# Patient Record
Sex: FEMALE | Race: WHITE | NOT HISPANIC OR LATINO | Employment: UNEMPLOYED | URBAN - METROPOLITAN AREA
[De-identification: names, ages, dates, MRNs, and addresses within clinical notes are randomized per-mention and may not be internally consistent; named-entity substitution may affect disease eponyms.]

---

## 2023-01-17 ENCOUNTER — HOSPITAL ENCOUNTER (EMERGENCY)
Facility: MEDICAL CENTER | Age: 53
End: 2023-01-17
Attending: STUDENT IN AN ORGANIZED HEALTH CARE EDUCATION/TRAINING PROGRAM
Payer: COMMERCIAL

## 2023-01-17 ENCOUNTER — APPOINTMENT (OUTPATIENT)
Dept: RADIOLOGY | Facility: MEDICAL CENTER | Age: 53
End: 2023-01-17
Attending: STUDENT IN AN ORGANIZED HEALTH CARE EDUCATION/TRAINING PROGRAM
Payer: COMMERCIAL

## 2023-01-17 VITALS
WEIGHT: 121.47 LBS | BODY MASS INDEX: 19.07 KG/M2 | OXYGEN SATURATION: 94 % | SYSTOLIC BLOOD PRESSURE: 97 MMHG | DIASTOLIC BLOOD PRESSURE: 57 MMHG | TEMPERATURE: 98.2 F | RESPIRATION RATE: 23 BRPM | HEART RATE: 69 BPM | HEIGHT: 67 IN

## 2023-01-17 DIAGNOSIS — K22.4 ESOPHAGEAL SPASM: ICD-10-CM

## 2023-01-17 DIAGNOSIS — K22.4 JACKHAMMER ESOPHAGUS: ICD-10-CM

## 2023-01-17 LAB
ALBUMIN SERPL BCP-MCNC: 4.6 G/DL (ref 3.2–4.9)
ALBUMIN/GLOB SERPL: 1.8 G/DL
ALP SERPL-CCNC: 112 U/L (ref 30–99)
ALT SERPL-CCNC: 16 U/L (ref 2–50)
ANION GAP SERPL CALC-SCNC: 10 MMOL/L (ref 7–16)
APPEARANCE UR: CLEAR
AST SERPL-CCNC: 18 U/L (ref 12–45)
BASOPHILS # BLD AUTO: 0.9 % (ref 0–1.8)
BASOPHILS # BLD: 0.07 K/UL (ref 0–0.12)
BILIRUB SERPL-MCNC: 0.2 MG/DL (ref 0.1–1.5)
BILIRUB UR QL STRIP.AUTO: NEGATIVE
BUN SERPL-MCNC: 11 MG/DL (ref 8–22)
CALCIUM ALBUM COR SERPL-MCNC: 9.2 MG/DL (ref 8.5–10.5)
CALCIUM SERPL-MCNC: 9.7 MG/DL (ref 8.5–10.5)
CHLORIDE SERPL-SCNC: 102 MMOL/L (ref 96–112)
CO2 SERPL-SCNC: 27 MMOL/L (ref 20–33)
COLOR UR: YELLOW
CREAT SERPL-MCNC: 0.49 MG/DL (ref 0.5–1.4)
EKG IMPRESSION: NORMAL
EOSINOPHIL # BLD AUTO: 0.09 K/UL (ref 0–0.51)
EOSINOPHIL NFR BLD: 1.1 % (ref 0–6.9)
ERYTHROCYTE [DISTWIDTH] IN BLOOD BY AUTOMATED COUNT: 43.5 FL (ref 35.9–50)
GFR SERPLBLD CREATININE-BSD FMLA CKD-EPI: 113 ML/MIN/1.73 M 2
GLOBULIN SER CALC-MCNC: 2.6 G/DL (ref 1.9–3.5)
GLUCOSE SERPL-MCNC: 85 MG/DL (ref 65–99)
GLUCOSE UR STRIP.AUTO-MCNC: NEGATIVE MG/DL
HCT VFR BLD AUTO: 44.1 % (ref 37–47)
HGB BLD-MCNC: 15 G/DL (ref 12–16)
IMM GRANULOCYTES # BLD AUTO: 0.03 K/UL (ref 0–0.11)
IMM GRANULOCYTES NFR BLD AUTO: 0.4 % (ref 0–0.9)
KETONES UR STRIP.AUTO-MCNC: NEGATIVE MG/DL
LEUKOCYTE ESTERASE UR QL STRIP.AUTO: NEGATIVE
LIPASE SERPL-CCNC: 19 U/L (ref 11–82)
LYMPHOCYTES # BLD AUTO: 2.09 K/UL (ref 1–4.8)
LYMPHOCYTES NFR BLD: 25.7 % (ref 22–41)
MCH RBC QN AUTO: 31.6 PG (ref 27–33)
MCHC RBC AUTO-ENTMCNC: 34 G/DL (ref 33.6–35)
MCV RBC AUTO: 92.8 FL (ref 81.4–97.8)
MICRO URNS: NORMAL
MONOCYTES # BLD AUTO: 0.39 K/UL (ref 0–0.85)
MONOCYTES NFR BLD AUTO: 4.8 % (ref 0–13.4)
NEUTROPHILS # BLD AUTO: 5.47 K/UL (ref 2–7.15)
NEUTROPHILS NFR BLD: 67.1 % (ref 44–72)
NITRITE UR QL STRIP.AUTO: NEGATIVE
NRBC # BLD AUTO: 0 K/UL
NRBC BLD-RTO: 0 /100 WBC
PH UR STRIP.AUTO: 6.5 [PH] (ref 5–8)
PLATELET # BLD AUTO: 266 K/UL (ref 164–446)
PMV BLD AUTO: 9.4 FL (ref 9–12.9)
POTASSIUM SERPL-SCNC: 4.5 MMOL/L (ref 3.6–5.5)
PROT SERPL-MCNC: 7.2 G/DL (ref 6–8.2)
PROT UR QL STRIP: NEGATIVE MG/DL
RBC # BLD AUTO: 4.75 M/UL (ref 4.2–5.4)
RBC UR QL AUTO: NEGATIVE
SODIUM SERPL-SCNC: 139 MMOL/L (ref 135–145)
SP GR UR STRIP.AUTO: 1.01
TROPONIN T SERPL-MCNC: <6 NG/L (ref 6–19)
TROPONIN T SERPL-MCNC: <6 NG/L (ref 6–19)
UROBILINOGEN UR STRIP.AUTO-MCNC: 0.2 MG/DL
WBC # BLD AUTO: 8.1 K/UL (ref 4.8–10.8)

## 2023-01-17 PROCEDURE — 36415 COLL VENOUS BLD VENIPUNCTURE: CPT

## 2023-01-17 PROCEDURE — 84484 ASSAY OF TROPONIN QUANT: CPT

## 2023-01-17 PROCEDURE — 81003 URINALYSIS AUTO W/O SCOPE: CPT

## 2023-01-17 PROCEDURE — 96375 TX/PRO/DX INJ NEW DRUG ADDON: CPT

## 2023-01-17 PROCEDURE — 83690 ASSAY OF LIPASE: CPT

## 2023-01-17 PROCEDURE — 99285 EMERGENCY DEPT VISIT HI MDM: CPT

## 2023-01-17 PROCEDURE — 93005 ELECTROCARDIOGRAM TRACING: CPT | Performed by: STUDENT IN AN ORGANIZED HEALTH CARE EDUCATION/TRAINING PROGRAM

## 2023-01-17 PROCEDURE — 93005 ELECTROCARDIOGRAM TRACING: CPT

## 2023-01-17 PROCEDURE — 96374 THER/PROPH/DIAG INJ IV PUSH: CPT

## 2023-01-17 PROCEDURE — 80053 COMPREHEN METABOLIC PANEL: CPT

## 2023-01-17 PROCEDURE — 71045 X-RAY EXAM CHEST 1 VIEW: CPT

## 2023-01-17 PROCEDURE — 85025 COMPLETE CBC W/AUTO DIFF WBC: CPT

## 2023-01-17 PROCEDURE — 700111 HCHG RX REV CODE 636 W/ 250 OVERRIDE (IP): Performed by: STUDENT IN AN ORGANIZED HEALTH CARE EDUCATION/TRAINING PROGRAM

## 2023-01-17 PROCEDURE — C9113 INJ PANTOPRAZOLE SODIUM, VIA: HCPCS | Performed by: STUDENT IN AN ORGANIZED HEALTH CARE EDUCATION/TRAINING PROGRAM

## 2023-01-17 RX ORDER — PANTOPRAZOLE SODIUM 40 MG/10ML
40 INJECTION, POWDER, LYOPHILIZED, FOR SOLUTION INTRAVENOUS ONCE
Status: COMPLETED | OUTPATIENT
Start: 2023-01-17 | End: 2023-01-17

## 2023-01-17 RX ORDER — OXYCODONE HYDROCHLORIDE 5 MG/1
5 TABLET ORAL EVERY 4 HOURS PRN
Qty: 10 TABLET | Refills: 0 | Status: SHIPPED | OUTPATIENT
Start: 2023-01-17 | End: 2023-01-20

## 2023-01-17 RX ORDER — CYCLOBENZAPRINE HCL 10 MG
10 TABLET ORAL 3 TIMES DAILY PRN
Qty: 30 TABLET | Refills: 0 | Status: SHIPPED | OUTPATIENT
Start: 2023-01-17

## 2023-01-17 RX ORDER — KETOROLAC TROMETHAMINE 30 MG/ML
15 INJECTION, SOLUTION INTRAMUSCULAR; INTRAVENOUS ONCE
Status: COMPLETED | OUTPATIENT
Start: 2023-01-17 | End: 2023-01-17

## 2023-01-17 RX ORDER — ONDANSETRON 2 MG/ML
4 INJECTION INTRAMUSCULAR; INTRAVENOUS ONCE
Status: COMPLETED | OUTPATIENT
Start: 2023-01-17 | End: 2023-01-17

## 2023-01-17 RX ORDER — HYDROMORPHONE HYDROCHLORIDE 1 MG/ML
0.5 INJECTION, SOLUTION INTRAMUSCULAR; INTRAVENOUS; SUBCUTANEOUS ONCE
Status: COMPLETED | OUTPATIENT
Start: 2023-01-17 | End: 2023-01-17

## 2023-01-17 RX ORDER — SERTRALINE HYDROCHLORIDE 100 MG/1
100 TABLET, FILM COATED ORAL DAILY
Qty: 14 TABLET | Refills: 0 | Status: SHIPPED | OUTPATIENT
Start: 2023-01-17 | End: 2023-01-31

## 2023-01-17 RX ADMIN — HYDROMORPHONE HYDROCHLORIDE 0.5 MG: 1 INJECTION, SOLUTION INTRAMUSCULAR; INTRAVENOUS; SUBCUTANEOUS at 19:47

## 2023-01-17 RX ADMIN — KETOROLAC TROMETHAMINE 15 MG: 30 INJECTION, SOLUTION INTRAMUSCULAR; INTRAVENOUS at 19:47

## 2023-01-17 RX ADMIN — PANTOPRAZOLE SODIUM 40 MG: 40 INJECTION, POWDER, LYOPHILIZED, FOR SOLUTION INTRAVENOUS at 19:45

## 2023-01-17 RX ADMIN — ONDANSETRON 4 MG: 2 INJECTION INTRAMUSCULAR; INTRAVENOUS at 19:47

## 2023-01-18 NOTE — ED TRIAGE NOTES
"Chief Complaint   Patient presents with    Abdominal Cramping     Pt states \"I have abdominal cramping and pain from my heart down to my big toe. I also have jackhammer esophagus and it's hard to swallow.\" Pt states symptoms have been persisting since 1/12. Complains of nausea and diarrhea.     Pt educated upon triage process and told to inform  staff of any changes in condition so that Pt may be reassessed. No further questions at this time. Pt sitting out in lobby.    "

## 2023-01-18 NOTE — ED PROVIDER NOTES
"ED Provider Note    CHIEF COMPLAINT  Chief Complaint   Patient presents with    Abdominal Cramping     Pt states \"I have abdominal cramping and pain from my heart down to my big toe. I also have jackhammer esophagus and it's hard to swallow.\" Pt states symptoms have been persisting since 1/12. Complains of nausea and diarrhea.       EXTERNAL RECORDS REVIEWED  Other None    HPI/ROS  LIMITATION TO HISTORY   Select: : None      Abi Dominguez is a 52 y.o. female who presents with esophageal spasms.  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 This has been an ongoing issue and she has previously been seen at the [facility].  She had tried many previous therapies including sertraline which she had been well controlled on for years.  She said her doctor transitioned her to duloxetine and she has been taking that now for months.  They have been increasing the dose as an outpatient and on Thursday she had her last dose increased to 90 mg daily.  She said that she has had severe pain since Thursday and has been so bad that she actually discontinued her duloxetine.  She says that the pain does feel typical of when she has her esophageal spasms however it is more severe.  She also endorses difficulty swallowing.  She says that it is painful from her chest, radiating all the way down to her toes. She says it is severe and unbearable.  Has associated nausea but no vomiting.  She has had some intermittent diarrhea.  She denies any fevers or chills.    She says that she gets her medical care from [facility] psychiatry.  She has tried many previous therapies including Botox which did not work.    PAST MEDICAL HISTORY    Jackhammer esophagus    SURGICAL HISTORY  patient denies any surgical history    FAMILY HISTORY  History reviewed. No pertinent family history.    SOCIAL HISTORY  Social History     Tobacco Use    Smoking status: Every Day     Types: Cigarettes    Smokeless tobacco: Never " "  Substance and Sexual Activity    Alcohol use: Not Currently    Drug use: Never    Sexual activity: Not on file       CURRENT MEDICATIONS  Home Medications    **Home medications have not yet been reviewed for this encounter**         ALLERGIES  Allergies   Allergen Reactions    Sulfa Drugs Rash and Itching             PHYSICAL EXAM  VITAL SIGNS: BP 97/57   Pulse 69   Temp 36.8 °C (98.2 °F) (Temporal)   Resp (!) 23   Ht 1.702 m (5' 7\")   Wt 55.1 kg (121 lb 7.6 oz)   SpO2 94%   BMI 19.03 kg/m²    Constitutional: Awake and alert . No distress  HENT: Normal inspection  moist mucous membranes  Eyes: Normal inspection  Neck: Grossly normal range of motion.  Cardiovascular: Normal heart rate, Normal rhythm.  Symmetric peripheral pulses.   Thorax & Lungs: No respiratory distress, No wheezing, No rales, No rhonchi, No chest tenderness.   Abdomen: Soft, non-distended, nontender, no mass  Skin: No obvious rash.  Extremities: Warm, well perfused. No clubbing, cyanosis, edema   Neurologic: Grossly normal   Psychiatric: Normal for situation      DIAGNOSTIC STUDIES / PROCEDURES  EKG  I have independently interpreted this EKG  Normal rate, normal rhythm, normal axis.  No ST wave elevations or depressions.  Intervals within normal limits.  She has right atrial enlargement with possibly RVH    LABS  Results for orders placed or performed during the hospital encounter of 01/17/23   CBC with Differential   Result Value Ref Range    WBC 8.1 4.8 - 10.8 K/uL    RBC 4.75 4.20 - 5.40 M/uL    Hemoglobin 15.0 12.0 - 16.0 g/dL    Hematocrit 44.1 37.0 - 47.0 %    MCV 92.8 81.4 - 97.8 fL    MCH 31.6 27.0 - 33.0 pg    MCHC 34.0 33.6 - 35.0 g/dL    RDW 43.5 35.9 - 50.0 fL    Platelet Count 266 164 - 446 K/uL    MPV 9.4 9.0 - 12.9 fL    Neutrophils-Polys 67.10 44.00 - 72.00 %    Lymphocytes 25.70 22.00 - 41.00 %    Monocytes 4.80 0.00 - 13.40 %    Eosinophils 1.10 0.00 - 6.90 %    Basophils 0.90 0.00 - 1.80 %    Immature Granulocytes 0.40 " 0.00 - 0.90 %    Nucleated RBC 0.00 /100 WBC    Neutrophils (Absolute) 5.47 2.00 - 7.15 K/uL    Lymphs (Absolute) 2.09 1.00 - 4.80 K/uL    Monos (Absolute) 0.39 0.00 - 0.85 K/uL    Eos (Absolute) 0.09 0.00 - 0.51 K/uL    Baso (Absolute) 0.07 0.00 - 0.12 K/uL    Immature Granulocytes (abs) 0.03 0.00 - 0.11 K/uL    NRBC (Absolute) 0.00 K/uL   Complete Metabolic Panel   Result Value Ref Range    Sodium 139 135 - 145 mmol/L    Potassium 4.5 3.6 - 5.5 mmol/L    Chloride 102 96 - 112 mmol/L    Co2 27 20 - 33 mmol/L    Anion Gap 10.0 7.0 - 16.0    Glucose 85 65 - 99 mg/dL    Bun 11 8 - 22 mg/dL    Creatinine 0.49 (L) 0.50 - 1.40 mg/dL    Calcium 9.7 8.5 - 10.5 mg/dL    AST(SGOT) 18 12 - 45 U/L    ALT(SGPT) 16 2 - 50 U/L    Alkaline Phosphatase 112 (H) 30 - 99 U/L    Total Bilirubin 0.2 0.1 - 1.5 mg/dL    Albumin 4.6 3.2 - 4.9 g/dL    Total Protein 7.2 6.0 - 8.2 g/dL    Globulin 2.6 1.9 - 3.5 g/dL    A-G Ratio 1.8 g/dL   Lipase   Result Value Ref Range    Lipase 19 11 - 82 U/L   Urinalysis    Specimen: Urine   Result Value Ref Range    Color Yellow     Character Clear     Specific Gravity 1.009 <1.035    Ph 6.5 5.0 - 8.0    Glucose Negative Negative mg/dL    Ketones Negative Negative mg/dL    Protein Negative Negative mg/dL    Bilirubin Negative Negative    Urobilinogen, Urine 0.2 Negative    Nitrite Negative Negative    Leukocyte Esterase Negative Negative    Occult Blood Negative Negative    Micro Urine Req see below    Troponins NOW   Result Value Ref Range    Troponin T <6 6 - 19 ng/L   Troponins in two (2) hours   Result Value Ref Range    Troponin T <6 6 - 19 ng/L   CORRECTED CALCIUM   Result Value Ref Range    Correct Calcium 9.2 8.5 - 10.5 mg/dL   ESTIMATED GFR   Result Value Ref Range    GFR (CKD-EPI) 113 >60 mL/min/1.73 m 2   EKG   Result Value Ref Range    Report       [facility] Emergency Dept.    Test Date:  2023-01-17  Pt Name:    LARS ABEL          Department: ER  MRN:         [id number]                      Room:  Gender:     Female                       Technician: [id number]  :        1970                   Requested By:ER TRIAGE PROTOCOL  Order #:    [id number]                    Reading MD:    Measurements  Intervals                                Axis  Rate:       72                           P:          78  MI:         146                          QRS:        70  QRSD:       85                           T:          69  QT:         414  QTc:        454    Interpretive Statements  Sinus rhythm  Right atrial enlargement  Consider RVH w/ secondary repol abnormality  No previous ECG available for comparison         RADIOLOGY  I have independently interpreted the diagnostic imaging associated with this visit and am waiting the final reading from the radiologist.   My preliminary interpretation is a follows: CXR no pneumonia    COURSE & MEDICAL DECISION MAKING    ED Observation Status? No; Patient does not meet criteria for ED Observation.     INITIAL ASSESSMENT AND PLAN  Care Narrative: This is a 52-year-old with a longstanding history of jackhammer esophagus.  She says that she was previously well controlled on an SSRI but since starting duloxetine has had worsening of her symptoms.  This acutely became acutely more severe on Thursday when her dose of duloxetine was increased to 90 mg.  She therefore stopped taking her duloxetine as of Friday.  She presents today for acute pain management.  She does state that the pain feels similar to when she has been having the spasms, albeit more severe.  Her work-up here is overall reassuring.  I doubt ACS as she has a normal  EKG and normal troponin.  She is low risk for ACS with a heart score of 1.  She has no other electrolyte derangements and her lab work is normal.  I doubt pulmonary embolism given chronicity of symptoms, no hypoxia or tachycardia.  Aortic pathology seems unlikely widening of the mediastinum on XR, she has symmetric pulses and  no neurological deficits.   I think that her pain is most consistent with her known history of jackhammer, esophagus.  Pain was controlled with IV Toradol, Dilaudid & pantoprazole.  I discussed this patient with pharmacist at length.  We agreed to take her off of the duloxetine and start her back on the sertraline.  She was previously on 200 mg daily and I will start her on 100 mg with recommendation to follow-up as an outpatient to have this further uptitrated.  On reevaluation her pain is significantly improved.  She feels comfortable with the plan for outpatient follow-up and strict ER return precautions.    ADDITIONAL PROBLEM LIST AND DISPOSITION    Problem #1: Esophageal Pain    I have discussed management of the patient with the following physicians and MERLENE's:  None    Discussion of management with other [facility] or appropriate source(s): Pharmacy see above      Barriers to care at this time, including but not limited to:  N/A .     Decision tools and prescription drugs considered including, but not limited to: Pain Medications provided .    HTN/IDDM FOLLOW UP:  The patient is referred to a primary physician for blood pressure management, diabetic screening, and for all other preventive health concerns    I prescribed a short course of opiates to this patient due to their degree of pain. I reviewed the PDMP and there are no findings of concern.  The patient was counseled that the prescribed pain medication is highly addictive.  They are instructed only to use it for breakthrough pain. They also must not drink, drive or operate machinery when using this medication.           FINAL DIAGNOSIS  1. Esophageal spasm Acute   2. Jackhammer esophagus               Electronically signed by: Shreya Cobb M.D., 1/17/2023 7:25 PM

## 2023-01-18 NOTE — DISCHARGE INSTRUCTIONS
Please contact your doctors this week in order to schedule a follow-up.  You will likely need to have the dose of your sertraline increased. You can stop taking your duloxetine.     For pain, take Ibuprofen (Motrin, Advil) 600 mg up to every six hours  mg up to every eight hours if your stomach can take it. Instead of Ibuprofen, you can choose Naproxen (Aleve) and take 250-500 mg twice daily. Take Ibuprofen or Naproxen with food to lessen stomach irritation. You may also take Acetaminophen (Tylenol) 500mg (may take up to 1gm) every six hours in addition to Ibuprofen or Naproxen. (Maximum Tylenol 4 gm/day).  I suggest alternating between the ibuprofen and Tylenol every 4 hours for optimal pain relief and adequate spacing of each medication.     The prescribed pain medication is highly addictive.  Please only take it for breakthrough pain that is not relieved by scheduled ibuprofen, Tylenol and other recommended medications.  Please be aware that the opiate pain or muscle relaxant medications that you have been prescribed may cause drowsiness or somnolence. DO NOT drink or drive or operate machinery when taking this medication.

## 2024-05-10 ENCOUNTER — HOSPITAL ENCOUNTER (EMERGENCY)
Facility: MEDICAL CENTER | Age: 54
End: 2024-05-10
Attending: STUDENT IN AN ORGANIZED HEALTH CARE EDUCATION/TRAINING PROGRAM
Payer: COMMERCIAL

## 2024-05-10 ENCOUNTER — PHARMACY VISIT (OUTPATIENT)
Dept: PHARMACY | Facility: MEDICAL CENTER | Age: 54
End: 2024-05-10
Payer: COMMERCIAL

## 2024-05-10 VITALS
RESPIRATION RATE: 17 BRPM | HEART RATE: 72 BPM | SYSTOLIC BLOOD PRESSURE: 116 MMHG | DIASTOLIC BLOOD PRESSURE: 71 MMHG | BODY MASS INDEX: 18.75 KG/M2 | TEMPERATURE: 98.7 F | OXYGEN SATURATION: 96 % | HEIGHT: 67 IN | WEIGHT: 119.49 LBS

## 2024-05-10 DIAGNOSIS — K04.7 PERIAPICAL ABSCESS: ICD-10-CM

## 2024-05-10 LAB
FLUAV RNA SPEC QL NAA+PROBE: NEGATIVE
FLUBV RNA SPEC QL NAA+PROBE: NEGATIVE
RSV RNA SPEC QL NAA+PROBE: NEGATIVE
SARS-COV-2 RNA RESP QL NAA+PROBE: NOTDETECTED

## 2024-05-10 PROCEDURE — RXMED WILLOW AMBULATORY MEDICATION CHARGE: Performed by: STUDENT IN AN ORGANIZED HEALTH CARE EDUCATION/TRAINING PROGRAM

## 2024-05-10 RX ORDER — TRAZODONE HYDROCHLORIDE 50 MG/1
50 TABLET ORAL NIGHTLY
COMMUNITY

## 2024-05-10 RX ORDER — OMEPRAZOLE 20 MG/1
20 CAPSULE, DELAYED RELEASE ORAL DAILY
COMMUNITY

## 2024-05-10 RX ORDER — IBUPROFEN 400 MG/1
400 TABLET ORAL EVERY 6 HOURS PRN
Qty: 30 TABLET | Refills: 0 | Status: SHIPPED | OUTPATIENT
Start: 2024-05-10

## 2024-05-10 RX ORDER — ACETAMINOPHEN 325 MG/1
1000 TABLET ORAL EVERY 6 HOURS PRN
Qty: 30 TABLET | Refills: 0 | Status: SHIPPED | OUTPATIENT
Start: 2024-05-10

## 2024-05-10 RX ORDER — LIDOCAINE HYDROCHLORIDE AND EPINEPHRINE 10; 10 MG/ML; UG/ML
20 INJECTION, SOLUTION INFILTRATION; PERINEURAL ONCE
Status: COMPLETED | OUTPATIENT
Start: 2024-05-10 | End: 2024-05-10

## 2024-05-10 RX ORDER — AMOXICILLIN AND CLAVULANATE POTASSIUM 875; 125 MG/1; MG/1
1 TABLET, FILM COATED ORAL 2 TIMES DAILY
Qty: 20 TABLET | Refills: 0 | Status: ACTIVE | OUTPATIENT
Start: 2024-05-10 | End: 2024-05-20

## 2024-05-10 RX ORDER — QUETIAPINE FUMARATE 25 MG/1
25 TABLET, FILM COATED ORAL 2 TIMES DAILY
COMMUNITY

## 2024-05-10 RX ORDER — AMOXICILLIN AND CLAVULANATE POTASSIUM 875; 125 MG/1; MG/1
1 TABLET, FILM COATED ORAL ONCE
Status: COMPLETED | OUTPATIENT
Start: 2024-05-10 | End: 2024-05-10

## 2024-05-10 RX ADMIN — LIDOCAINE HYDROCHLORIDE AND EPINEPHRINE 20 ML: 10; 10 INJECTION, SOLUTION INFILTRATION; PERINEURAL at 22:59

## 2024-05-10 RX ADMIN — AMOXICILLIN AND CLAVULANATE POTASSIUM 1 TABLET: 875; 125 TABLET, FILM COATED ORAL at 22:51

## 2024-05-10 ASSESSMENT — FIBROSIS 4 INDEX: FIB4 SCORE: 0.9

## 2024-05-11 NOTE — ED NOTES
Patient discharged with prescriptions and instruction. Verbalized understanding. Dental referral list provided.

## 2024-05-11 NOTE — ED PROVIDER NOTES
"ED Provider Note    CHIEF COMPLAINT  Chief Complaint   Patient presents with    Nasal Congestion     On and off x couple of days. Denies cough, sore throat, fever/chills. Hx: sinusitis       EXTERNAL RECORDS REVIEWED  No relevant external notes available    HPI/ROS  LIMITATION TO HISTORY   Select: : None  OUTSIDE HISTORIAN(S):  None    Abi Dominguez is a 54 y.o. female presenting to the emergency department for left facial fullness, left maxillary tooth pain.  Says she has been congested over the last few days.  Symptoms started approximately 5 days ago but swelling to the face has progressed over the last 2 days.  No fevers or chills.  No body aches.    Has not seen a dentist for several years.    PAST MEDICAL HISTORY       SURGICAL HISTORY  patient denies any surgical history    FAMILY HISTORY  No family history on file.    SOCIAL HISTORY  Social History     Tobacco Use    Smoking status: Every Day     Types: Cigarettes    Smokeless tobacco: Never   Substance and Sexual Activity    Alcohol use: Not Currently    Drug use: Never    Sexual activity: Not on file       CURRENT MEDICATIONS  Home Medications       Reviewed by Isidro Phillips R.N. (Registered Nurse) on 05/10/24 at 2135  Med List Status: Partial     Medication Last Dose Status   cyclobenzaprine (FLEXERIL) 10 mg Tab  Active   omeprazole (PRILOSEC) 20 MG delayed-release capsule  Active   QUEtiapine (SEROQUEL) 25 MG Tab  Active   sertraline (ZOLOFT) 50 MG Tab  Active   traZODone (DESYREL) 50 MG Tab  Active                    ALLERGIES  Allergies   Allergen Reactions    Sulfa Drugs Rash and Itching             PHYSICAL EXAM  VITAL SIGNS: /71   Pulse 72   Temp 37.1 °C (98.7 °F) (Temporal)   Resp 17   Ht 1.702 m (5' 7\")   Wt 54.2 kg (119 lb 7.8 oz)   SpO2 96%   BMI 18.71 kg/m²    General:  non-toxic, no acute distress  Neuro: oriented x 3, moving all extremities.   HEENT:   - Head: Normocephalic, atraumatic  - Eyes: PERRL  -Face: Fullness to " the left maxillary cheek  - Ears/Nose: normal external nose and ears  - Mouth: Poor dentition, periapical abscess overlying tooth #12, 13.  Underlying teeth are loose and slightly tender to palpation.  Midline uvula.  No trismus.  No asymmetry of the posterior oropharynx.  No sublingual edema.  No tenderness or looseness to the mandibular teeth.  Resp: no increased work of breathing  CV: Perfusing well  Abd: Not vomiting, no apparent abdominal discomfort  Extremities: No peripheral edema  Skin: Not diaphoretic  Psych: lucid and conversational         DIAGNOSTIC STUDIES / PROCEDURES    EKG  My independent EKG interpretation:  Results for orders placed or performed during the hospital encounter of 23   EKG   Result Value Ref Range    Report       [facility] Emergency Dept.    Test Date:  2023  Pt Name:    LARS ABEL          Department: ER  MRN:        [id number]                      Room:  Gender:     Female                       Technician: [id number]  :        1970                   Requested By:ER TRIAGE PROTOCOL  Order #:    [id number]                    Reading MD:    Measurements  Intervals                                Axis  Rate:       72                           P:          78  AR:         146                          QRS:        70  QRSD:       85                           T:          69  QT:         414  QTc:        454    Interpretive Statements  Sinus rhythm  Right atrial enlargement  Consider RVH w/ secondary repol abnormality  No previous ECG available for comparison         LABS  Results for orders placed or performed during the hospital encounter of 05/10/24   POC CoV-2, FLU A/B, RSV by PCR   Result Value Ref Range    POC Influenza A RNA, PCR Negative Negative    POC Influenza B RNA, PCR Negative Negative    POC RSV, by PCR Negative Negative    POC SARS-CoV-2, PCR NotDetected        RADIOLOGY  I have independently interpreted the diagnostic imaging  "associated with this visit and am waiting the final reading from the radiologist.   My preliminary interpretation is as follows:   -   Radiologist interpretation:   No orders to display           MEDICAL DECISION MAKING      ED COURSE AND PLAN    Abi Dominguez is a 54 y.o. female into the emergency department for left maxillary tooth pain.  She has several loose teeth with poor dentition and a rather large left periapical abscess.  I performed a superior alveolar nerve block as described below.  I lanced the abscess with several pokes of an 18-gauge needle with return of purulent discharge.    Patient is systemically well, not septic, afebrile.  I considered but do not feel that a CT of the face is warranted  Her COVID flu RSV testing ordered at triage was negative.    Given initial dose of Augmentin in the emergency department and discharged with a 10-day course to go home with.  She was given dental resources and advised to see a dentist for tooth extraction.    Strict return precautions discussed.      ---Pertinent ED Course---:    1:19 AM I reviewed the patient's old records in Epic, medication list, allergies, past medical history and performed a physical examination.             Procedures:    Dental Nerve Block (   Posterior Superior Alveolar nerve block)   CPT code 28189 (Injection, anesthetic agent; other peripheral nerve or branch)        Authorized by: Dhaval Villavicencio DO      Consent: Verbal consent obtained.      Risks and benefits: risks, benefits and alternatives were discussed      Consent given by: patient and/or guardian      Patient understanding: patient/guardian states understanding of the procedure being performed      Patient consent: the patient/guardian's understanding of the procedure matches consent given      Patient identity confirmed: verbally with patient and arm band      Time out: Immediately prior to procedure a \"time out\" was called to verify the correct patient, procedure, " equipment, support staff and site/side marked as required.      Location details:  Posterior Superior Alveolar nerve block      Injection: 27 gauge needle, using standard, sterile technique cc of lidocaine with epinephrine injected. Anatomic landmarks identified, anatomic landmarks palpated, introduced needle, negative aspiration for blood and incremental injection      Complication: Tolerated well without complication.      Incision and Drainage Procedure Note    Indication: Right apical abscess    Procedure: Performed dental block as described above.  I lanced the incision with 2 stab incisions using an 18-gauge needle with purulent discharge output    The patient tolerated the procedure well.    Complications: None    ----------------------------------------------------------------------------------  DISCUSSIONS    I have discussed management of the patient with the following physicians and MERLENE's:      Discussion of management with other [facility] or appropriate source(s):     Escalation of care considered, and ultimately not performed: Occasion for CT of the face, no indication for oral surgery consultation in the emergency department.    Barriers to care at this time, including but not limited to: Does not currently have a dentist outpatient, was given resources    Decision tools and prescription drugs considered including, but not limited to: Scribed Augmentin    FINAL IMPRESSION    1. Periapical abscess        Discharge Medication List as of 5/10/2024 11:22 PM        START taking these medications    Details   amoxicillin-clavulanate (AUGMENTIN) 875-125 MG Tab Take 1 Tablet by mouth 2 times a day for 10 days., Disp-20 Tablet, R-0, Normal      ibuprofen (MOTRIN) 400 MG Tab Take 1 Tablet by mouth every 6 hours as needed for Moderate Pain., Disp-30 Tablet, R-0, Normal      acetaminophen (TYLENOL) 325 MG Tab Take 3 Tablets by mouth every 6 hours as needed for Mild Pain., Disp-30 Tablet, R-0, Normal                DISPOSITION    Discharge home, Stable        This chart was dictated using an electronic voice recognition software. The chart has been reviewed and edited but there is still possibility for dictation errors due to limitation of software.    Dhaval Villavicencio,  5/11/2024

## 2024-05-11 NOTE — ED TRIAGE NOTES
"Chief Complaint   Patient presents with    Nasal Congestion     On and off x couple of days. Denies cough, sore throat, fever/chills. Hx: sinusitis       Pt ambulatory to triage. Pt A&Ox4,.     Pt to lobby . Pt educated on alerting staff in changes to condition. Pt verbalized understanding.     /76   Pulse 84   Temp 37 °C (98.6 °F) (Temporal)   Resp 16   Ht 1.702 m (5' 7\")   Wt 54.2 kg (119 lb 7.8 oz)   SpO2 96%   BMI 18.71 kg/m²    "

## 2024-05-11 NOTE — ED NOTES
Assessment made. Chart up for MD to see. C/o left side of face swollen, looks like dental abscess, started Thursday.

## 2024-12-05 ENCOUNTER — APPOINTMENT (OUTPATIENT)
Dept: RADIOLOGY | Facility: MEDICAL CENTER | Age: 54
End: 2024-12-05
Attending: INTERNAL MEDICINE
Payer: COMMERCIAL

## 2024-12-05 DIAGNOSIS — M25.50 POLYARTHRALGIA: ICD-10-CM

## 2024-12-05 PROCEDURE — 72200 X-RAY EXAM SI JOINTS: CPT

## 2024-12-05 PROCEDURE — 73620 X-RAY EXAM OF FOOT: CPT | Mod: RT

## 2024-12-05 PROCEDURE — 73130 X-RAY EXAM OF HAND: CPT | Mod: RT

## 2024-12-05 PROCEDURE — 73130 X-RAY EXAM OF HAND: CPT | Mod: LT

## 2024-12-05 PROCEDURE — 73620 X-RAY EXAM OF FOOT: CPT | Mod: LT
